# Patient Record
Sex: MALE | Race: WHITE | NOT HISPANIC OR LATINO | URBAN - NONMETROPOLITAN AREA
[De-identification: names, ages, dates, MRNs, and addresses within clinical notes are randomized per-mention and may not be internally consistent; named-entity substitution may affect disease eponyms.]

---

## 2020-05-20 ENCOUNTER — IMPORTED ENCOUNTER (OUTPATIENT)
Dept: URBAN - NONMETROPOLITAN AREA CLINIC 1 | Facility: CLINIC | Age: 45
End: 2020-05-20

## 2020-05-20 PROBLEM — H00.15: Noted: 2020-05-20

## 2020-05-20 PROCEDURE — 99203 OFFICE O/P NEW LOW 30 MIN: CPT

## 2020-05-20 NOTE — PATIENT DISCUSSION
Chalazion: L/L OS-Recommend pt begin warm compresses and lid scrubs BID as well as artificial tears OU QID. Pt instructed on proper technique for lid scrubs. START KEFLEX 500MG TID PO X 1WK

## 2022-04-09 ASSESSMENT — VISUAL ACUITY
OD_CC: 20/40
OS_CC: 20/25+2

## 2023-07-28 ENCOUNTER — NEW PATIENT (OUTPATIENT)
Dept: RURAL CLINIC 1 | Facility: CLINIC | Age: 48
End: 2023-07-28

## 2023-07-28 DIAGNOSIS — H52.223: ICD-10-CM

## 2023-07-28 PROCEDURE — 92004 COMPRE OPH EXAM NEW PT 1/>: CPT

## 2023-07-28 PROCEDURE — 92015 DETERMINE REFRACTIVE STATE: CPT

## 2023-07-28 ASSESSMENT — VISUAL ACUITY
OD_SC: 20/50
OS_SC: 20/40
OU_SC: 20/30

## 2023-07-28 ASSESSMENT — TONOMETRY
OD_IOP_MMHG: 17
OS_IOP_MMHG: 17